# Patient Record
Sex: FEMALE | Race: WHITE | Employment: FULL TIME | URBAN - METROPOLITAN AREA
[De-identification: names, ages, dates, MRNs, and addresses within clinical notes are randomized per-mention and may not be internally consistent; named-entity substitution may affect disease eponyms.]

---

## 2021-09-05 ENCOUNTER — APPOINTMENT (OUTPATIENT)
Dept: GENERAL RADIOLOGY | Age: 26
End: 2021-09-05
Attending: EMERGENCY MEDICINE

## 2021-09-05 ENCOUNTER — HOSPITAL ENCOUNTER (EMERGENCY)
Age: 26
Discharge: HOME | End: 2021-09-05
Attending: EMERGENCY MEDICINE

## 2021-09-05 VITALS
WEIGHT: 110 LBS | RESPIRATION RATE: 16 BRPM | OXYGEN SATURATION: 98 % | BODY MASS INDEX: 17.68 KG/M2 | DIASTOLIC BLOOD PRESSURE: 89 MMHG | SYSTOLIC BLOOD PRESSURE: 143 MMHG | TEMPERATURE: 98 F | HEIGHT: 66 IN | HEART RATE: 105 BPM

## 2021-09-05 DIAGNOSIS — S62.114A NONDISPLACED FRACTURE OF TRIQUETRUM (CUNEIFORM) BONE, RIGHT WRIST, INITIAL ENCOUNTER FOR CLOSED FRACTURE: Primary | ICD-10-CM

## 2021-09-05 PROCEDURE — 73110 X-RAY EXAM OF WRIST: CPT

## 2021-09-05 PROCEDURE — 99283 EMERGENCY DEPT VISIT LOW MDM: CPT

## 2021-09-05 PROCEDURE — 73080 X-RAY EXAM OF ELBOW: CPT

## 2021-09-05 RX ORDER — DICLOFENAC SODIUM 50 MG/1
50 TABLET, DELAYED RELEASE ORAL 2 TIMES DAILY
Qty: 14 TABLET | Refills: 0 | Status: SHIPPED | OUTPATIENT
Start: 2021-09-05 | End: 2021-09-12

## 2021-09-05 NOTE — DISCHARGE INSTRUCTIONS
Wear the splint as needed for pain relief. Please follow-up with an orthopedist when you return home to [place].

## 2021-09-05 NOTE — ED NOTES
Applied medium velcro right wrist splint to patient's wrist.    I have reviewed discharge instructions with the patient. The patient verbalized understanding. Patient left ED via Discharge Method: ambulatory to Home. Opportunity for questions and clarification provided. Patient given 1 scripts. To continue your aftercare when you leave the hospital, you may receive an automated call from our care team to check in on how you are doing. This is a free service and part of our promise to provide the best care and service to meet your aftercare needs.  If you have questions, or wish to unsubscribe from this service please call [phone]. Thank you for Choosing our [facility].

## 2021-09-05 NOTE — ED PROVIDER NOTES
49-year-old lady presents with concerns about pain in her right wrist after walking in intoxicated friend at home. She said she was the designated  for [employer] [address] last night and when the friend stumbled both of them went down and the patient landed with an outstretched right wrist.  She denies any other injury. Elements of this note were created using speech recognition software. As such, errors of speech recognition may be present. History reviewed. No pertinent past medical history. History reviewed. No pertinent surgical history. History reviewed. No pertinent family history. Social History     Socioeconomic History    Marital status: Not on file     Spouse name: Not on file    Number of children: Not on file    Years of education: Not on file    Highest education level: Not on file   Occupational History    Not on file   Tobacco Use    Smoking status: Current Every Day Smoker    Smokeless tobacco: Never Used   Substance and Sexual Activity    Alcohol use: Not Currently    Drug use: Not Currently     Types: Marijuana    Sexual activity: Not on file   Other Topics Concern    Not on file   Social History Narrative    Not on file     Social Determinants of Health     Financial Resource Strain:     Difficulty of Paying Living Expenses:    Food Insecurity:     Worried About Running Out of Food in the Last Year:     [address] in the Last Year:    Transportation Needs:     Lack of Transportation (Medical):      Lack of Transportation (Non-Medical):    Physical Activity:     Days of Exercise per Week:     Minutes of Exercise per Session:    Stress:     Feeling of Stress :    Social Connections:     Frequency of Communication with Friends and Family:     Frequency of Social Gatherings with Friends and Family:     Attends Sikhism Services:     Active Member of Clubs or Organizations:     Attends Club or Organization Meetings:     Marital Status:    Intimate Partner Violence:     Fear of Current or Ex-Partner:     Emotionally Abused:     Physically Abused:     Sexually Abused: ALLERGIES: Patient has no known allergies. Review of Systems   Constitutional: Negative for chills and fever. Respiratory: Negative for cough and shortness of breath. Musculoskeletal: Positive for arthralgias. Negative for joint swelling and myalgias. Skin: Negative for color change and wound. Vitals:    09/05/21 0706   BP: (!) 143/89   Pulse: (!) 105   Resp: 16   Temp: 98 °F (36.7 °C)   SpO2: 98%   Weight: 49.9 kg (110 lb)   Height: 5' 6\" (1.676 m)            Physical Exam  Vitals and nursing note reviewed. Constitutional:       Appearance: Normal appearance. Musculoskeletal:      Comments: She has tenderness on the dorsal surface of her right wrist with no obvious deformity. Normal sensation in all digits with normal capillary refill. 2+ radial and ulnar pulses. Skin:     General: Skin is warm and dry. Neurological:      Mental Status: She is alert. MDM  Number of Diagnoses or Management Options  Diagnosis management comments: I will x-ray her wrist and elbow. ED Course as of Sep 05 0818   [facility] 2021   3976 Elbow x-ray is unremarkable. Wrist x-ray reveals a small triquetrum fracture. Patient is visiting from [place]. I will put her in a Velcro wrist splint and encourage her to follow-up with a doctor when she returns to [place] in a couple of days.     [AC]      ED Course User Index  [AC] Clement Dickson MD       Procedures